# Patient Record
Sex: MALE | Race: WHITE
[De-identification: names, ages, dates, MRNs, and addresses within clinical notes are randomized per-mention and may not be internally consistent; named-entity substitution may affect disease eponyms.]

---

## 2017-11-25 ENCOUNTER — HOSPITAL ENCOUNTER (EMERGENCY)
Dept: HOSPITAL 17 - PHEFT | Age: 52
Discharge: HOME | End: 2017-11-25
Payer: COMMERCIAL

## 2017-11-25 VITALS
SYSTOLIC BLOOD PRESSURE: 124 MMHG | HEART RATE: 80 BPM | DIASTOLIC BLOOD PRESSURE: 70 MMHG | OXYGEN SATURATION: 97 % | RESPIRATION RATE: 16 BRPM | TEMPERATURE: 97.8 F

## 2017-11-25 VITALS — WEIGHT: 214.29 LBS | BODY MASS INDEX: 30 KG/M2 | HEIGHT: 71 IN

## 2017-11-25 DIAGNOSIS — M10.9: ICD-10-CM
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
## 2017-11-25 DIAGNOSIS — L03.115: Primary | ICD-10-CM
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
## 2017-11-25 LAB
ANION GAP SERPL CALC-SCNC: 7 MEQ/L (ref 5–15)
BASOPHILS # BLD AUTO: 0.1 TH/MM3 (ref 0–0.2)
BASOPHILS NFR BLD: 2.3 % (ref 0–2)
BUN SERPL-MCNC: 10 MG/DL (ref 7–18)
CHLORIDE SERPL-SCNC: 112 MEQ/L (ref 98–107)
EOSINOPHIL # BLD: 0.1 TH/MM3 (ref 0–0.4)
EOSINOPHIL NFR BLD: 1.6 % (ref 0–4)
ERYTHROCYTE [DISTWIDTH] IN BLOOD BY AUTOMATED COUNT: 12.7 % (ref 11.6–17.2)
GFR SERPLBLD BASED ON 1.73 SQ M-ARVRAT: 105 ML/MIN (ref 89–?)
HCO3 BLD-SCNC: 24.3 MEQ/L (ref 21–32)
HCT VFR BLD CALC: 38 % (ref 39–51)
HEMO FLAGS: (no result)
LYMPHOCYTES # BLD AUTO: 1.7 TH/MM3 (ref 1–4.8)
LYMPHOCYTES NFR BLD AUTO: 30.8 % (ref 9–44)
MCH RBC QN AUTO: 28.4 PG (ref 27–34)
MCHC RBC AUTO-ENTMCNC: 32.4 % (ref 32–36)
MCV RBC AUTO: 87.5 FL (ref 80–100)
MONOCYTES NFR BLD: 11.1 % (ref 0–8)
NEUTROPHILS # BLD AUTO: 3.1 TH/MM3 (ref 1.8–7.7)
NEUTROPHILS NFR BLD AUTO: 54.2 % (ref 16–70)
PLATELET # BLD: 184 TH/MM3 (ref 150–450)
POTASSIUM SERPL-SCNC: 3.8 MEQ/L (ref 3.5–5.1)
RBC # BLD AUTO: 4.34 MIL/MM3 (ref 4.5–5.9)
SODIUM SERPL-SCNC: 143 MEQ/L (ref 136–145)
WBC # BLD AUTO: 5.6 TH/MM3 (ref 4–11)

## 2017-11-25 PROCEDURE — 83605 ASSAY OF LACTIC ACID: CPT

## 2017-11-25 PROCEDURE — 73610 X-RAY EXAM OF ANKLE: CPT

## 2017-11-25 PROCEDURE — 96365 THER/PROPH/DIAG IV INF INIT: CPT

## 2017-11-25 PROCEDURE — 85025 COMPLETE CBC W/AUTO DIFF WBC: CPT

## 2017-11-25 PROCEDURE — 87040 BLOOD CULTURE FOR BACTERIA: CPT

## 2017-11-25 PROCEDURE — 80048 BASIC METABOLIC PNL TOTAL CA: CPT

## 2017-11-25 NOTE — PD
HPI


Chief Complaint:  Pain: Acute or Chronic


Time Seen by Provider:  13:53


Travel History


International Travel<30 days:  No


Contact w/Intl Traveler<30days:  No


Traveled to known affect area:  No





History of Present Illness


HPI


This is a 52-year-old male with right ankle pain, swelling, redness times one 

day.  5 days ago while shopping the ankle was contused by a shopping cart.  No 

break in the skin that he was aware of.  He had only minimal pain at the time 

of the injury.  Last night he developed increasing pain.  Today he noticed 

redness and swelling.  He denies fever but reports chills.  He has had  14 

surgery to the Achilles tendon in this extremity.  No history of osteomyelitis.





PFSH


Past Medical History


ADHD:  Yes


Arthritis:  Yes (GOUTY ARTHRITIS)


Autoimmune Disease:  Yes


Anxiety:  Yes


Cardiovascular Problems:  No


Cerebrovascular Accident:  No


Diabetes:  Yes (HX OF, STATES NO LONGER DM AFTER HAVING GASTRIC BYPASS)


Patient Takes Glucophage:  No


Diminished Hearing:  Yes


Endocrine:  No


Glaucoma:  Yes


Gout:  Yes


Genitourinary:  No


Headaches:  Yes


Musculoskeletal:  Yes


Neurologic:  Yes (NEUROPATHY/RLS)


Respiratory:  Yes (SLEEP APNEA)


Seizures:  No


Influenza Vaccination:  Yes





Past Surgical History


Abdominal Surgery:  Yes (gastric bypass 2009)


Body Medical Devices:  GOUT


Cholecystectomy:  Yes


Ear Surgery:  Yes (Mastoid, TUBES AS CHILD)


Endocrine Surgery:  No


Eye Surgery:  Yes (R EYE BELVEDEER CAT IMPLANT)


Oral Surgery:  Yes (TONSILLECTOMY)


Tonsillectomy:  Yes (AND adenoids)


Other Surgery:  Yes (GASTRIC BYPASS, SINUS)





Social History


Alcohol Use:  Yes (OCCASIONAL)


Tobacco Use:  No


Substance Use:  No





Allergies-Medications


(Allergen,Severity, Reaction):  


Coded Allergies:  


     No Known Allergies (Verified  Adverse Reaction, Unknown, 11/25/17)


Reported Meds & Prescriptions





Reported Meds & Active Scripts


Active


Clindamycin (Clindamycin HCl) 300 Mg Cap 300 Mg PO Q6H 10 Days


Clindamycin Hcl (Clindamycin HCl) 300 Mg Cap 300 Mg PO Q6HR 10 Days


Reported


Pramipexole Dihydrochlori (Pramipexole Dihydrochloride) 0.125 Mg Tab Unknown 

Dose PO HS


Lunesta (Eszopiclone) 2 Mg Tab 2 Mg PO HS


Prozac (Fluoxetine HCl) 20 Mg Cap 20 Mg PO DAILY








Review of Systems


Except as stated in HPI:  all other systems reviewed are Neg


General / Constitutional:  Positive: Chills, No: Fever





Physical Exam


Narrative


GENERAL: Well-nourished, well-developed patient.


SKIN: Focused skin assessment warm/dry.


HEAD: Normocephalic.


EYES: No scleral icterus. No injection or drainage. 


CARDIOVASCULAR: Regular rate and rhythm without murmurs, gallops, or rubs. 


RESPIRATORY: Breath sounds equal bilaterally. No accessory muscle use.


GASTROINTESTINAL: Abdomen soft, non-tender, nondistended. 


MUSCULOSKELETAL: No cyanosis.  Lower extremity: Notable swelling, erythema, 

warmth to the lateral aspect of the ankle which extends from the mid foot to 

the bottom third of the lower extremity.  He has a well-healed surgical scar to 

the posterior aspect of the ankle at the location of the Achilles tendon.  2+ 

dorsal pedis pulse.  Brisk cap refill.





Data


Data


Last Documented VS





Vital Signs








  Date Time  Temp Pulse Resp B/P (MAP) Pulse Ox O2 Delivery O2 Flow Rate FiO2


 


11/25/17 13:25 97.8 80 16 124/70 (88) 97   








Orders





 Orders


Basic Metabolic Panel (Bmp) (11/25/17 14:24)


Complete Blood Count With Diff (11/25/17 14:24)


Blood Culture (11/25/17 14:24)


Iv Access Insert/Monitor (11/25/17 14:24)


Lactic Acid (11/25/17 14:24)


Ankle, Complete ([id number]) (11/25/17 )


Clindamycin Inj (Cleocin Inj) (11/25/17 15:15)


Ed Discharge Order (11/25/17 15:49)





Labs





Laboratory Tests








Test


  11/25/17


14:40 11/25/17


14:48


 


Lactic Acid Level 0.9 mmol/L  


 


White Blood Count  5.6 TH/MM3 


 


Red Blood Count  4.34 MIL/MM3 


 


Hemoglobin  12.3 GM/DL 


 


Hematocrit  38.0 % 


 


Mean Corpuscular Volume  87.5 FL 


 


Mean Corpuscular Hemoglobin  28.4 PG 


 


Mean Corpuscular Hemoglobin


Concent 


  32.4 % 


 


 


Red Cell Distribution Width  12.7 % 


 


Platelet Count  184 TH/MM3 


 


Mean Platelet Volume  7.0 FL 


 


Neutrophils (%) (Auto)  54.2 % 


 


Lymphocytes (%) (Auto)  30.8 % 


 


Monocytes (%) (Auto)  11.1 % 


 


Eosinophils (%) (Auto)  1.6 % 


 


Basophils (%) (Auto)  2.3 % 


 


Neutrophils # (Auto)  3.1 TH/MM3 


 


Lymphocytes # (Auto)  1.7 TH/MM3 


 


Monocytes # (Auto)  0.6 TH/MM3 


 


Eosinophils # (Auto)  0.1 TH/MM3 


 


Basophils # (Auto)  0.1 TH/MM3 


 


CBC Comment  DIFF FINAL 


 


Differential Comment   


 


Blood Urea Nitrogen  10 MG/DL 


 


Creatinine  0.78 MG/DL 


 


Random Glucose  70 MG/DL 


 


Calcium Level  8.2 MG/DL 


 


Sodium Level  143 MEQ/L 


 


Potassium Level  3.8 MEQ/L 


 


Chloride Level  112 MEQ/L 


 


Carbon Dioxide Level  24.3 MEQ/L 


 


Anion Gap  7 MEQ/L 


 


Estimat Glomerular Filtration


Rate 


  105 ML/MIN 


 











MDM


Medical Decision Making


Medical Screen Exam Complete:  Yes


Emergency Medical Condition:  Yes


Interpretation(s)


CBC: Unremarkable.  WBC 5.6


BMP: No gross abnormality


Lactic: 0.9


X-ray right ankle: No acute findings


Differential Diagnosis


Abscess, cellulitis, septic arthritis


Narrative Course


52 -year-old male with warmth and erythema of the right ankle times one day.  

The areas is consistent with cellulitis.  IV access established, CBC, BMP, 

lactic acid, blood cultures, x-ray ordered and pending.  Patient was given 

clindamycin 600 mg IV.  Labs and x-rays were reviewed. WBC 5.6, lactic acid 0.9

, x-ray no acute abnormalities.  Patient's vital signs are stable his lab work 

is reassuring he will be treated with outpatient oral antibiotics and 

instructed to follow-up for recheck with his primary doctor.  Return 

precautions were discussed.  Patient verbalizes understanding and agrees to plan





Diagnosis





 Primary Impression:  


 Cellulitis


 Qualified Codes:  L03.115 - Cellulitis of right lower limb


Referrals:  


Primary Care Physician





***Additional Instructions:  


Take the clindamycin as directed.


Take over-the-counter Motrin or Tylenol as needed for pain.


Follow-up the primary doctor on Monday for recheck.


Return to emergency department if he developed new or worsening symptoms


Scripts


Clindamycin (Clindamycin) 300 Mg Cap


300 MG PO Q6H for Infection for 10 Days, #40 CAP 0 Refills


   Prov: Xiomy Castellanos         11/25/17


Disposition:  01 DISCHARGE HOME


Condition:  Stable











Xiomy Castellanos Nov 25, 2017 14:45

## 2017-11-25 NOTE — RADRPT
EXAM DATE/TIME:  11/25/2017 15:03 

 

[facility] COMPARISON:     

FOOT RIGHT COMPLETE ([id number]), August 25, 2014, 11:08.

 

                     

INDICATIONS :     

Right ankle pain. Fall.

                     

 

MEDICAL HISTORY :     

None.          

 

SURGICAL HISTORY :     

None.   Foot and ankle for clubfeet.

 

ENCOUNTER:     

Initial                                        

 

ACUITY:     

3 days      

 

PAIN SCORE:     

6/10

 

LOCATION:     

Right lateral 

 

FINDINGS:     

Three-view examination of the ankle demonstrates advanced arthropathy involving the mid foot and hind
foot, similar in appearance to prior examination in 2014 and having features characteristic of diabet
ic neuropathy.  There is severe osteopenia of the talus; the cortical margins of the talus are mainta
ined the oblique view.  Unfused plantar ossification near the calcaneus is stable in size.  Multiple 
hemoclips in the soft tissues of the posterior leg.

 

CONCLUSION:     

No acute findings.  Advanced midfoot and hindfoot arthropathy probably related to diabetic neuropathy
, stable in severity when compared to 2014.

 

 

 

 Gaston Frausto MD on November 25, 2017 at 15:15           

Board Certified Radiologist.

 This report was verified electronically.

## 2018-01-09 ENCOUNTER — HOSPITAL ENCOUNTER (EMERGENCY)
Dept: HOSPITAL 17 - PHEFT | Age: 53
Discharge: HOME | End: 2018-01-09
Payer: COMMERCIAL

## 2018-01-09 VITALS — BODY MASS INDEX: 29.75 KG/M2 | WEIGHT: 212.53 LBS | HEIGHT: 71 IN

## 2018-01-09 VITALS
HEART RATE: 66 BPM | RESPIRATION RATE: 16 BRPM | DIASTOLIC BLOOD PRESSURE: 59 MMHG | SYSTOLIC BLOOD PRESSURE: 120 MMHG | OXYGEN SATURATION: 98 % | TEMPERATURE: 98 F

## 2018-01-09 DIAGNOSIS — M25.562: Primary | ICD-10-CM

## 2018-01-09 DIAGNOSIS — Z87.39: ICD-10-CM

## 2018-01-09 DIAGNOSIS — M17.12: ICD-10-CM

## 2018-01-09 DIAGNOSIS — M85.80: ICD-10-CM

## 2018-01-09 DIAGNOSIS — Z86.39: ICD-10-CM

## 2018-01-09 DIAGNOSIS — Z86.59: ICD-10-CM

## 2018-01-09 DIAGNOSIS — F41.9: ICD-10-CM

## 2018-01-09 DIAGNOSIS — G47.30: ICD-10-CM

## 2018-01-09 PROCEDURE — 73564 X-RAY EXAM KNEE 4 OR MORE: CPT

## 2018-01-09 PROCEDURE — 99284 EMERGENCY DEPT VISIT MOD MDM: CPT

## 2018-01-09 PROCEDURE — 96372 THER/PROPH/DIAG INJ SC/IM: CPT

## 2018-01-09 NOTE — RADRPT
EXAM DATE/TIME:  01/09/2018 10:42 

 

[facility] COMPARISON:     

No previous studies available for comparison.

 

                     

INDICATIONS :     

Left knee pain, no known injury.

                     

 

MEDICAL HISTORY :            

gout   

 

SURGICAL HISTORY :     

None.   

 

ENCOUNTER:     

Initial                                        

 

ACUITY:     

2 days      

 

PAIN SCORE:     

10/10

 

LOCATION:     

Left posterior knee

 

FINDINGS:     

A standard 4 view examination of the left knee was obtained and demonstrates mild osteopenia and norm
al alignment. Chondrocalcinosis is noted in the medial and lateral compartments with mild joint space
 loss and spurring. There are mild degenerative changes in the patellofemoral joint as well with scle
rosis and mild spurring. There is no distinct joint effusion. No focal soft tissue abnormalities iden
tified.

 

CONCLUSION:     

1. Mild 3 compartment degenerative change most characteristic of osteoarthritis versus calcium pyroph
osphate deposition disorder.

2. Mild osteopenia.

 

 

 

 Aristeo Song MD on January 09, 2018 at 11:07           

Board Certified Radiologist.

 This report was verified electronically.

## 2018-01-09 NOTE — PD
HPI


Chief Complaint:  Musculoskeletal Complaint


Time Seen by Provider:  10:17


Travel History


International Travel<30 days:  No


Contact w/Intl Traveler<30days:  No


Traveled to known affect area:  No





History of Present Illness


HPI


Patient comes in complaining of throbbing pain in his left knee that began 

yesterday.  Patient is uncertain if it may be his gout or cellulitis.  Patient 

has been taking his indomethacin last dose yesterday with little to no 

improvement of symptoms.  Pain is worse with walking and certain movement.  

Patient denies any trauma, fevers, numbness or tingling, or radiation of the 

pain.





PFSH


Past Medical History


ADHD:  Yes


Arthritis:  Yes (GOUTY ARTHRITIS)


Autoimmune Disease:  Yes


Anxiety:  Yes


Cardiovascular Problems:  No


Cerebrovascular Accident:  No


Diabetes:  Yes (HX OF, STATES NO LONGER DM AFTER HAVING GASTRIC BYPASS)


Patient Takes Glucophage:  No


Diminished Hearing:  Yes


Endocrine:  No


Glaucoma:  Yes


Gout:  Yes


Genitourinary:  No


Headaches:  Yes


Musculoskeletal:  Yes


Neurologic:  Yes (NEUROPATHY/RLS)


Respiratory:  Yes (SLEEP APNEA)


Seizures:  No





Past Surgical History


Abdominal Surgery:  Yes (gastric bypass 2009)


Body Medical Devices:  GOUT


Cholecystectomy:  Yes


Ear Surgery:  Yes (Mastoid, TUBES AS CHILD)


Endocrine Surgery:  No


Eye Surgery:  Yes (R EYE BELVEDEER CAT IMPLANT)


Oral Surgery:  Yes (TONSILLECTOMY)


Tonsillectomy:  Yes (AND adenoids)


Other Surgery:  Yes (GASTRIC BYPASS, SINUS)





Social History


Alcohol Use:  Yes (OCCASIONAL)


Tobacco Use:  No


Substance Use:  No





Allergies-Medications


(Allergen,Severity, Reaction):  


Coded Allergies:  


     No Known Allergies (Verified  Adverse Reaction, Unknown, 1/9/18)


Reported Meds & Prescriptions





Reported Meds & Active Scripts


Active


Clindamycin (Clindamycin HCl) 300 Mg Cap 300 Mg PO Q6H 10 Days


Reported


[Anxiety Med]     


Temazepam 7.5 Mg Cap 10 Mg PO HS PRN








Review of Systems


Except as stated in HPI:  all other systems reviewed are Neg





Physical Exam


Narrative


GENERAL: Well-developed, overly nourished, in no acute distress, and non-ill 

appearing.


SKIN: Focused skin assessment warm and dry.  No erythematous, induration, 

fluctuation, or crepitus.


HEAD: Atraumatic. Normocephalic. 


EYES: Pupils equal and round. EOMI. No scleral icterus. No injection or 

drainage. 


ENT: No nasal bleeding or discharge.  Mucous membranes pink and moist.


NECK: Trachea midline. Supple.  No nuclear rigidity.


CARDIOVASCULAR: Dorsal pulses 2+, intact, and equal bilaterally.  Capillary 

refill less than 2 seconds.


RESPIRATORY: No accessory muscle use.  No respiratory distress. 


MUSCULOSKELETAL: No obvious deformities. No clubbing.  No cyanosis.  No edema.  

Full range of motion. Knee: Negative patellar apprehension, varus and valgus 

maneuvers, anterior draw test, and Ramón test. Pulses equal BL distal to 

injury. Capillary refill less than 2 seconds distal to injury and equal BL. 

FROM distal to injury and equal BL.  Strength distal to injury equal BL. NV 

intact distal to injury.  Dorsal pulses equal BL. Sensation equal BL 1st web 

space. Negative Homans sign.  There is no crepitus, fluctuation, induration, 

erythematous, or signs of infectious process.  It is afebrile


NEUROLOGICAL: Awake and alert. No obvious cranial nerve deficits.  Motor 

grossly within normal limits. Normal speech.


PSYCHIATRIC: Appropriate mood and affect; insight and judgment normal.





Data


Data


Last Documented VS





Vital Signs








  Date Time  Temp Pulse Resp B/P (MAP) Pulse Ox O2 Delivery O2 Flow Rate FiO2


 


1/9/18 09:54 98.0 66 16 120/59 (79) 98   








Orders





 Orders


Ketorolac Inj (Toradol Inj) (1/9/18 10:30)


Knee, Complete (4vws) (1/9/18 )


Ice/Cold Pack (1/9/18 10:24)


Ed Discharge Order (1/9/18 11:20)


Splint Or Brace Apply/Monitor (1/9/18 11:20)








[facility]


Medical Decision Making


Medical Screen Exam Complete:  Yes


Emergency Medical Condition:  Yes


Interpretation(s)





Last Impressions








Knee X-Ray 1/9/18 0000 Signed





Impressions: 





 Service Date/Time:  Tuesday, January 9, 2018 10:42 - CONCLUSION:  1. Mild 3 





 compartment degenerative change most characteristic of osteoarthritis versus 





 calcium pyrophosphate deposition disorder. 2. Mild osteopenia.     Aristeo Song MD 








Differential Diagnosis


Fracture, sprain, gout, osteoarthritis, meniscus tear, pseudogout, septic joint


Narrative Course


There is no clinical evidence for fracture. There is no clinical evidence to 

suspect bony injury or infectious process by exam. Radiographic examination 

revealed no fracture seen at this time. No obvious ligamental injury or 

internal derangement is noted at this time. The distal extremity appears 

neurovascularly intact, without evidence of neurovascular injury nor 

compartment syndrome. Tendon exam also was intact. The effected limb was 

splinted. The patient was discharged and given warnings for vascular 

compromise. The patient is to follow up with Orthopedics. The patient agrees 

with plan.





Patient in no obvious distress upon re-evaluation. All pertinent Radiology 

result(s) discussed with patient/family. Any questions/concerns in reference to 

patient diagnosis/condition discussed and clarified prior to patient's 

discharge. Reinforced sheer importance of close follow up with patient's 

primary physician or primary care clinic. Instructed patient to return to ED 

immediately, if symptoms return/worsen. Patient showed understanding of above 

instructions.  Further instructions and recommendations were detailed in 

discharge paperwork.  Patient ambulated without difficulty out of ED at 

discharge.





Diagnosis





 Primary Impression:  


 Left knee pain


 Qualified Codes:  M25.562 - Pain in left knee


 Additional Impressions:  


 Osteoarthritis


 Qualified Codes:  M17.12 - Unilateral primary osteoarthritis, left knee


 Osteopenia


 Qualified Codes:  M85.80 - Other specified disorders of bone density and 

structure, unspecified site


Referrals:  


Yinka Hyatt MD


Patient Instructions:  General Instructions, Knee Pain (ED), Osteoarthritis (DC)

, Osteopenia (GEN)





***Additional Instructions:  


Follow-up with your primary care physician and/or orthopedics this week for 

reevaluation.  Take your indomethacin as prescribed for pain control.  Wear Ace 

wrap as needed for comfort.  Return to the emergency department if symptoms get 

worse.


Disposition:  01 DISCHARGE HOME


Condition:  Stable











Corwin Dyson Jan 9, 2018 10:27

## 2018-02-21 ENCOUNTER — HOSPITAL ENCOUNTER (EMERGENCY)
Dept: HOSPITAL 17 - PHED | Age: 53
Discharge: HOME | End: 2018-02-21
Payer: COMMERCIAL

## 2018-02-21 VITALS
OXYGEN SATURATION: 98 % | DIASTOLIC BLOOD PRESSURE: 79 MMHG | SYSTOLIC BLOOD PRESSURE: 132 MMHG | TEMPERATURE: 97.9 F | RESPIRATION RATE: 20 BRPM | HEART RATE: 98 BPM

## 2018-02-21 VITALS — BODY MASS INDEX: 30.46 KG/M2 | WEIGHT: 217.6 LBS | HEIGHT: 71 IN

## 2018-02-21 DIAGNOSIS — M10.9: Primary | ICD-10-CM

## 2018-02-21 DIAGNOSIS — Z98.84: ICD-10-CM

## 2018-02-21 PROCEDURE — 96372 THER/PROPH/DIAG INJ SC/IM: CPT

## 2018-02-21 PROCEDURE — 99283 EMERGENCY DEPT VISIT LOW MDM: CPT

## 2018-02-21 NOTE — PD
HPI


Chief Complaint:  Edema


Time Seen by Provider:  03:51


Travel History


International Travel<30 days:  No


Contact w/Intl Traveler<30days:  No


Traveled to known affect area:  No





History of Present Illness


HPI


The patient is a 52-year-old  male who presents emergency department 

for inflammation the left elbow.  The patient notes that his left elbow has 

been somewhat erythematous and swollen last several days.  The patient has a 

history of similar symptoms in the past secondary to gout.  He has had fluid 

drawn from his joints in the past which are positive for crystals.  The patient 

states Indocin works well, also requests a shot of Decadron IM to start his 

relief.  The patient has a history of a lipoma the left upper extremity just 

proximal to the elbow which is compromising the ulnar nerve.  He is scheduled 

undergo surgery by his plastic surgeon in [place], [place], Dr. Xie.  The 

patient denies any fever.  He does state the area is tender to palpation.  He 

denies any trauma to the affected area.  Symptoms are moderate.





PFSH


Past Medical History


ADHD:  Yes


Arthritis:  Yes (GOUTY ARTHRITIS)


Autoimmune Disease:  Yes


Anxiety:  Yes


Cardiovascular Problems:  No


Cerebrovascular Accident:  No


Diabetes:  Yes (HX OF, STATES NO LONGER DM AFTER HAVING GASTRIC BYPASS)


Diminished Hearing:  Yes


Endocrine:  No


Glaucoma:  Yes


Gout:  Yes


Genitourinary:  No


Headaches:  Yes


Musculoskeletal:  Yes


Neurologic:  Yes (NEUROPATHY/RLS)


Respiratory:  Yes (SLEEP APNEA)


Seizures:  No





Past Surgical History


Abdominal Surgery:  Yes (gastric bypass 2009)


Body Medical Devices:  GOUT


Cholecystectomy:  Yes


Ear Surgery:  Yes (Mastoid, TUBES AS CHILD)


Endocrine Surgery:  No


Eye Surgery:  Yes (R EYE BELVEDEER CAT IMPLANT)


Oral Surgery:  Yes (TONSILLECTOMY)


Tonsillectomy:  Yes (AND adenoids)


Other Surgery:  Yes (GASTRIC BYPASS, SINUS)





Social History


Alcohol Use:  Yes (OCCASIONAL)


Tobacco Use:  No


Substance Use:  No





Allergies-Medications


(Allergen,Severity, Reaction):  


Coded Allergies:  


     No Known Allergies (Verified  Adverse Reaction, Unknown, 2/21/18)


Reported Meds & Prescriptions





Reported Meds & Active Scripts


Active


Reported


Indomethacin 50 Mg Cap 50 Mg PO TID


     Take with food, milk, or antacids to decrease


     stomach adverse effects.


Buspirone (Buspirone HCl) 10 Mg Tab 10 Mg PO DAILY


Temazepam 7.5 Mg Cap 10 Mg PO HS PRN








Review of Systems


Except as stated in HPI:  all other systems reviewed are Neg


General / Constitutional:  No: Fever


Musculoskeletal:  Positive: Edema, Pain


Skin:  Positive Other (as noted in history present illness)


Neurologic:  Positive: Paresthesia (numbness and tingling to the fourth and 

fifth digit of the left upper extremity secondary to lipoma overriding the nerve

)





Physical Exam


Narrative


GENERAL: Awake, alert, pleasant 52-year-old male who appears his stated age and 

is in no acute respiratory distress.


SKIN: Focused skin assessment warm/dry.


HEAD: Atraumatic. Normocephalic. 


EYES: No injection or drainage.


MUSCULOSKELETAL: The left elbow is erythematous, edematous, tender palpation of 

the extensor surface.  He has a lipoma just proximal to the left elbow over the 

medial aspect.  He has limited range of motion with flexion extension left 

elbow secondary to pain secondary to the lipoma per his report.  Intrinsic hand 

muscles are intact.  Positive left radial pulse. 


NEUROLOGICAL: Awake and alert. No obvious cranial nerve deficits.  Motor 

grossly within normal limits. Normal speech.


PSYCHIATRIC: Appropriate mood and affect; insight and judgment normal.





Data


Data


Last Documented VS





Vital Signs








  Date Time  Temp Pulse Resp B/P (MAP) Pulse Ox O2 Delivery O2 Flow Rate FiO2


 


2/21/18 03:43 97.9 98 20 132/79 (96) 98   








Orders





 Orders


Dexamethasone Inj (Decadron Inj) (2/21/18 04:00)








MDM


Medical Decision Making


Medical Screen Exam Complete:  Yes


Emergency Medical Condition:  Yes


Medical Record Reviewed:  Yes


Differential Diagnosis


Differential diagnosis includes gout, pseudogout, arthropathy, septic joint, 

abscess, bursitis.


Narrative Course


The patient has a history of gouty arthropathy.  I reviewed the EMR, he has 

previous arthrocentesis which is positive for crystals.  The patient does have 

a history of gastric bypass, however, states he's had a distended and steroids 

in the past without difficulty.  He is requesting a steroid injection, therefore

, the patient was administered Decadron 8 mg IM.  I will place patient on 

Indocin 3 times a day for 5 days and pain medications.  He is advised to follow-

up with his primary physician.  Return if symptoms worsen or progress.





Diagnosis





 Primary Impression:  


 Acute gouty arthropathy


Patient Instructions:  General Instructions





***Additional Instructions:  


Medications as directed.  Follow-up with your primary physician.  Return if 

symptoms worsen or progress.


***Med/Other Pt SpecificInfo:  Prescription(s) given


Scripts


Hydrocodone-Acetaminophen (Norco) 5 Mg-325 Mg Tab


1 TAB PO Q6H Y for PAIN, #12 TAB 0 Refills


   Prov: Remington Tay MD         2/21/18 


Indomethacin (Indomethacin) 50 Mg Cap


50 MG PO TID for 5 Days, CAP 0 Refills


   Take with food, milk, or antacids to decrease


   stomach adverse effects.


   Prov: Remington Tay MD         2/21/18


Disposition:  01 DISCHARGE HOME


Condition:  Stable











Remington Tay MD Feb 21, 2018 04:01